# Patient Record
Sex: FEMALE
[De-identification: names, ages, dates, MRNs, and addresses within clinical notes are randomized per-mention and may not be internally consistent; named-entity substitution may affect disease eponyms.]

---

## 2021-03-04 ENCOUNTER — HOSPITAL ENCOUNTER (OUTPATIENT)
Dept: HOSPITAL 53 - M RAD | Age: 66
End: 2021-03-04
Attending: INTERNAL MEDICINE
Payer: SELF-PAY

## 2021-03-04 DIAGNOSIS — K57.90: ICD-10-CM

## 2021-03-04 DIAGNOSIS — N28.1: ICD-10-CM

## 2021-03-04 DIAGNOSIS — R82.89: Primary | ICD-10-CM

## 2021-03-04 LAB
ALBUMIN SERPL BCG-MCNC: 4 GM/DL (ref 3.2–5.2)
ALT SERPL W P-5'-P-CCNC: 22 U/L (ref 12–78)
BILIRUB SERPL-MCNC: 0.7 MG/DL (ref 0.2–1)
BUN SERPL-MCNC: 19 MG/DL (ref 7–18)
CALCIUM SERPL-MCNC: 9.8 MG/DL (ref 8.8–10.2)
CHLORIDE SERPL-SCNC: 105 MEQ/L (ref 98–107)
CO2 SERPL-SCNC: 29 MEQ/L (ref 21–32)
CREAT SERPL-MCNC: 0.79 MG/DL (ref 0.55–1.3)
GFR SERPL CREATININE-BSD FRML MDRD: > 60 ML/MIN/{1.73_M2} (ref 45–?)
GLUCOSE SERPL-MCNC: 88 MG/DL (ref 70–100)
POTASSIUM SERPL-SCNC: 4.8 MEQ/L (ref 3.5–5.1)
PROT SERPL-MCNC: 7.6 GM/DL (ref 6.4–8.2)
SODIUM SERPL-SCNC: 137 MEQ/L (ref 136–145)

## 2021-03-04 PROCEDURE — 80053 COMPREHEN METABOLIC PANEL: CPT

## 2021-03-04 PROCEDURE — 74178 CT ABD&PLV WO CNTR FLWD CNTR: CPT

## 2021-03-04 NOTE — REP
INDICATION:

URINE CYTOLOGY ADNORMAL.



COMPARISON:

None



TECHNIQUE:

Axial contrast-enhanced images from the lung bases to the pubic symphysis using oral

and 100 cc Isovue 370 intravenous contrast material.  Precontrast images of the

abdomen obtained along with coronal and sagittal reformations.



This CT examination was performed using the following dose reduction techniques:

Automated exposure control, adjustment of mA and/or kv according to the patient's

size, and the use of iterative reconstruction technique.



FINDINGS:

Liver, spleen, pancreas, gallbladder, bilateral adrenal glands are normal.



Kidneys are essentially age-appropriate.  Incidental 12 mm right renal cyst noted.  No

perinephric stranding, hydroureteronephrosis, nephroureterolithiasis or obvious renal

mass lesion appreciated.



The enteric system including stomach, small, and large bowel appears normal.  No

evidence for obstruction or acute inflammatory process.  Normal terminal ileum and

appendix are identified in the right lower quadrant.  Diffuse colonic and sigmoid

diverticulosis noted without acute diverticulitis.



Pelvis demonstrates normal bladder and presumed myomatous changes to the uterus.



No ascites.  No free air.  No intraperitoneal or retroperitoneal adenopathy.

Abdominal aorta and vasculature appear normal.  Musculoskeletal structures are intact

and without acute osseous abnormality.



IMPRESSION:

No acute abdominopelvic pathology appreciated.

Incidental 12 mm right renal cyst.

Diverticulosis.





<Electronically signed by Marcello Sidhu > 03/04/21 [id number]

## 2021-03-17 ENCOUNTER — HOSPITAL ENCOUNTER (OUTPATIENT)
Dept: HOSPITAL 53 - M SMT | Age: 66
End: 2021-03-17
Attending: UROLOGY
Payer: SELF-PAY

## 2021-03-17 DIAGNOSIS — R31.29: Primary | ICD-10-CM

## 2021-04-01 ENCOUNTER — HOSPITAL ENCOUNTER (OUTPATIENT)
Dept: HOSPITAL 53 - M RAD | Age: 66
End: 2021-04-01
Attending: UROLOGY
Payer: SELF-PAY

## 2021-04-01 DIAGNOSIS — R31.29: Primary | ICD-10-CM

## 2021-04-01 DIAGNOSIS — D30.01: ICD-10-CM

## 2021-04-01 PROCEDURE — 74178 CT ABD&PLV WO CNTR FLWD CNTR: CPT

## 2021-04-02 NOTE — REP
INDICATION:

MICROSCOPIC HEMATURIA.



COMPARISON:

03/04/2021



TECHNIQUE:

Axial precontrast, contrast-enhanced and delayed images from the lung bases to the

pubic symphysis using 100 cc Isovue 370 intravenous contrast material.  .



This CT examination was performed using the following dose reduction techniques:

Automated exposure control, adjustment of mA and/or kv according to the patient's

size, and the use of iterative reconstruction technique.



FINDINGS:

Liver, spleen, pancreas, gallbladder, and bilateral adrenal glands are normal.



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

cyst due to prior CT technique) without hydroureteronephrosis, perinephric stranding,

nephroureterolithiasis, cystic or renal mass lesion.  Delayed images demonstrate

normal collecting system.



The enteric system including stomach, small, and large bowel appears normal.  No

evidence for obstruction or acute inflammatory process.  Normal terminal ileum and

appendix are identified in the right lower quadrant.  Scattered diverticula noted

without acute diverticulitis.



Pelvis demonstrates normal bladder and age-appropriate uterus/adnexa.



No ascites.  No free air.  No intraperitoneal or retroperitoneal adenopathy.

Abdominal aorta and vasculature appear normal.  Musculoskeletal structures are intact

and without acute osseous abnormality.



IMPRESSION:

No acute abdominopelvic pathology appreciated.

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





<Electronically signed by Marcello Sidhu > 04/02/21 [id number]